# Patient Record
Sex: FEMALE | Race: WHITE
[De-identification: names, ages, dates, MRNs, and addresses within clinical notes are randomized per-mention and may not be internally consistent; named-entity substitution may affect disease eponyms.]

---

## 2020-08-18 NOTE — NUR
CALLED DR TRAMMELL [facility] FOR CONSULT. DONE. HE CALLED. HOLD ;LOVENOX AND MAKE NPO
MIDNITE FOR POSS PROCEDURE. DONE

## 2020-08-18 NOTE — NUR
PT ADMITTED AT 1730. SISTER KAREN IN TO HELP. I WAS ABLE TO DO QUICK ADMIT AND
THE ASSESSMENT. BOTH IV FLUID BOLUS' DONE, MAINT FLUIDS STARTED. CALLED DR TRAMMELL FOR REFERRAL. HE CALLED BACK AND NPO MIDNITE AND HOLD LOVENOX. WILL SEE
TOMORROW. DISCUSSED WITH ONCOMMING RN. H/R REG, NO MURMER ANOTED. TELE WAS
ORDERED. LUNGS CLEAR, RESP EASY, UNLABORED. ON R.A. BT X4 LAST BM PERHAPS 2
DAYS. VOIDS INCONT. PLACED ATTENDS. PT HAS MULT SCABS ON EXTREMETIES, SMALL
BRUISE ON INNER THIGH.  AND MOVEMENT ON RT HAND IS POOR. PERHAPS RELATED
TO PAIN FROM SHOULDER LESION. PT ABLE TO TOUCH NOSE WITHEACH HAND FOREFINGER.
VERY SLOW WITH RT HAND. STATES PAIN. PT IS SLOW TO RESPOND, BUT RESPONDS
AORIENTED 2-3. BED IN LOW POSITION,C ALL LITE IN REACH, BED ALARM ON FOR
SAFETY, SISTER AT BEDSIDE.

## 2020-08-19 NOTE — NUR
Spiritual cre visit conducted. Patient's Day surgery RN Veronica savages me
with a request from family for prayer. I arrive in the day surgery prep area
and patient's sister Shannon is present and informs me that things are not
going well for patient and that patient's O2 stats are 87 and they would like
me to pray for patient and for the surgery. I gladly provide prayer. Shannon
also asks if I could go fing patient's other sister Amanda. I retrieve Amanda
and bring her to the surgery area. I will continue to remain available to
patient and family.

## 2020-08-19 NOTE — NUR
PT REPORT FROM JAMAL ALEJANDRE RN, SISTERS AT BEDSIDE, PT SOMNOLENT, STARTLED WITH
TRANSFER TO [facility]. SISTERS VERY ANXIOUS AT BEDSIDE, BROUGHT PT TO [facility] VIA
[facility] WITHOUT DIFFICULTY, PT O2SATS AT 87% ON RA. TITRATED OXYGEN UP TO 3LNC
TO OBTAIN SATS SUSTAINED AT 93%. TEMP 103.4, UNCOVERED PT AND PLACED COOL
CLOTH TO HEAD, PT DIAPHORETIC AND FLUSHED, HR TACHY 'S. B/P
HYPERTENSIVE. PROVIDED EMOTIONAL SUPPORT TO FAMILY AFTER TAKING CARE OF
PATIENT. SPIRITUAL CARE AND PATIENT ADVOCATE IMPLEMENTED INTO PT CARE. SPENT
ALOT OF TIME TRYING TO KEEP FAMILIES ANXIETY LEVEL AT A MINIMUM WITHOUT MUCH
SUCCESS. DR TRAMMELL AND JOSHUA AWARE OF PT ASSESSMENT AND FAMILY CONCERNS.

## 2020-08-19 NOTE — NUR
positive blood culture called Clarisse in Pharmacy to verify proper antibiotic
coverage. Reestablished IV access with ultrasound guided placement. PT resting
quietly after 0.5 mg po ativan given. incontinent of large amt of urine foul
smelling.
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
## 2020-08-19 NOTE — NUR
DR Almeida gave 1 x dose of ativan for anxiety 0.5 mg for restless behavior.
He also rx dilaudid 1 mg iv q 6 prn severe pain. Sister continues at bedside.

## 2020-08-19 NOTE — NUR
SUMMARY
 
PT BACK FROM SURGERY, FAMILY AT THE BEDSIDE, SISTERS HAVE BEEN AT THE BEDSIDE
FOR MOST OF THE DAY, PT HAS REMAINED CONFUSED FOR MOST OF THE DAY, PT MED PER
EMAR FOR PAIN, PT DOES NOT USE THE CALL LIGHT APPROPRIATELY, PT ONLY ANSWERING
SIMPLE YES AND NO QUESTIONS, PT RETURNED WITH A DRESSING IN PLACE TO THE R
SHOULDER AND A DEBI DRAIN IN PLACE, PT HAS SANGUINEOUS DRAINAGE FROM THE DEBI AND
AROUND THE TUBING, DRESSING HAS BEEN REINFORCED, PT RESTLESS IN BED, THRASHING
AROUND, NOT AWAKE ENOUGH TO ANSWER QUESTIONS, FAMILY WILL REMAIN AT THE
BEDSIDE T/O THE NIGHT, VSS, WILL CONT TO MONITOR
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
## 2020-08-20 NOTE — NUR
SISTER HAS SHOWN UP, WE DISCUSSED THE CHANGES THAT HAVE OCCURRED OVER THE LAST
TWO DAYS. GIL ENDED UP WAKING UP AND IS ABLE TO HOLD CONVERSATION. SHE
DOES KNOW SHE IS IN THE HOSPTIAL BUT THOUGHT IT TO BE IN [place], SHE SAID
SHE GOT HURT AGAIN. THEN SHE REMEMBERED HER SHOULDER. SHE FOLLOWS DIRECTIONS
WELL. TOOK DOWN AN ENSURE AND SOME WATER. GOT UP WITH 1 ASSIST TO THE BSC,
VOIDED 300CC AND WET ATTENDS. MILD PAIN NOTED IN NECK AND BACK. GOT HER BACK
TO HER RECLINER CHAIR ALARM IS ON. SHE IS STILL SLEEPY BUT DOING ALOT BETTER.
WILL CONTINUE TO MONITOR.

## 2020-08-20 NOTE — NUR
ASSUMED CARE: GIL IS UP IN THE RECLINER SLEEPING COMFORTABLY. DOES OPEN
EYES TO HER NAME. SHE IS ABLE TO ANSWER SIMPLE YES NO QUESTIONS. DENIES PAIN
AT THIS TIME. JUST GOT PAIN MEDS PRIOR TO SHIFT CHANGE WHICH HAS MADE HER
SLEEPY. FOLLOWS DIRECTIONS WELL. DRESSING TO SHOULDER IS STILL INTACT, MILD
DRAINAGE NOTED, NO LEAKAGE. IV INFUSING FLUIDS WITH NO PROBLEMS. ATTENDS DRY.
CHAIR ALARM IS ON. WILL CONTINUE TO MONITOR.

## 2020-08-20 NOTE — NUR
Spiritual care visit conducted. I meet with patient's sister, Amanda, outside
the  because patient is sleeping. Amanda tells me about the events of
yesterday and how things improved once patient was in for the procedure.
Amanda shares about the emotions that she is trying to process and that she is
trying to get out of town. I listen empathically, encourage self-care and
provide a calming presence. I will continue to remain available to patient and
family.

## 2020-08-20 NOTE — NUR
SHIFT SUMMARY: ALERT, CONFUSED, ABLE TO SPEAK A FEW SENTENCES BETWEEN
MEDICATING FOR PAIN. SISTER AT BEDSIDE. SLEPT START OF SHIFT DUE TO DILUDID.
THEN SHE BECAME VERY RESTLESS, TOSSING AND TURNING IN BED, UNABLE TO GET
COMFORTABLE, AND NOT UNDERSTANDING WHAT IS HAPPENING. VS WITH TACHYCARDIA IN
'S, FEBRILE RUNNING 99. STILL AWAITING FINAL RESULTS OF BLOOD CULTURES.
RESTLESSNESS CONTINUED TO INCREASE THROUGHOUT THE NIGHT, R/T ANXIOUSNESS AND
BACK PAIN. DILAUDID GIVEN EVERY 4 HOURS BUT SHE WOULD WAKE UP PRIOR TO THAT
PULLING AT LINES, VERY RESTLESS IN BED. ATTEMPTED TO GIVE FENTALYL 50MCQ AND
ATIVAN ORAL. FENTALYL DID NOTHING FOR HER PAIN, AND THE ORAL ATIVAN SHE WOULD
NOT SWALLOW BUT ALLOWED IT TO DISSOLVE ON HER TONGUE. DID NOT GIVE ANY OTHER
ORAL MEDS DUE TO RISK OF ASPIRATION. HAD TO CALL MD DUE TO INCREASE AGGITATION
RESTLESS, GOT IV ATIVAN THIS AM AND GAVE 1MG, THIS HELPED SOME. DRESSING TO
RIGHT SHOULDER HAS BEEN LEAKING SEROUS SANGUOUS FLUID AROUND THE DRAIN TUBE
SITE, RE-ENFORCED DRESSING WITH FOAM DRESSING AND TAPE. DRAIN HAS PUT OUT 20CC
OF SEROUS SANGUOUS. SHE IS CONSTANTLY MOVING ARM WHICH IS CAUSING IT TO LEAK.
LEFT DRESSING IN PLACE AS MD DOES FIRST DRESSING CHANGE. IV ANTIBOTICS INFUSED
WITH NO PROBLEMS. TELE RUNNING SINUS TACH. ATTENDS CHANGE PRN. CALL LIGHT
REMAINED IN REACH OF SISTER WHO STAYED THE NIGHT. WILL REPORT TO DAY SHIFT
WHEN THEY ARRIVE.

## 2020-08-20 NOTE — NUR
SUMMARY
 
PT RESTING IN THE CHAIR AT THE BEDSIDE, FAMILY IN THE ROOM VISITING, PT HAS
BEEN CONFUSED AND RESTLESS T/O THE DAY, PT MORE AWAKE AND ALERT THAN
YESTERDAY, PT ABLE TO GET UP WITH 2P ASSIST AT THE BEGINNING OF THE DAY AND IS
NOW A 1P ASSIST, PT WAS INCONTINENT FOR MOST OF THE DAY, HAS NOW BEGUN TO ASK
TO GET UP PRIOR TO VOIDING, PT TOLERATING PILLS WHOLE WITH WATER AND TOLERATED
BEING FED HER MEALS, PT WITH POOR DEPTH PERCEPTION AND UNABLE TO BRING A CUP
TO HER MOUTH, PT HAS BEEN MED PER EMAR FOR PAIN, PHYSICAL THERAPY HAS WORKED
WITH THE PT AND ARE RECOMMENDING SNF AT THIS TIME, FAMILY IS AGREEABLE AT THIS
TIME, DR WATSON HAS BEEN IN TO SEE THE PT AND HAS CHANGED THE ANTIBIOTICS,
DR TRAMMELL HAS CAME IN AND REMOVED THE DEBI DRAIN, PT AYO WELL, VSS, NO
COMPLAINTS, NO ACUTE CHANGES, WILL CONT TO MONITOR

## 2020-08-21 NOTE — NUR
CALLED NOLAN GROSSMAN, REPORTED NO BM SINCE ADMIT AND THRUSH IN MOUTH. ORDER FOR
NYSTATIN SWISH AND SWALLOW, COLACE AND SENNA. WILL START TONIGHT.

## 2020-08-21 NOTE — NUR
SUMMARY
 
PT RESTING IN THE CHAIR AT THE BEDSIDE, PT HAS BEEN IN THE CHAIR FOR MOST OF
THE DAY, DOES NOT TOLERATE BEING IN THE BED, IS CONFUSED AND RESTLESS, UP WITH
1-2 PERSON ASSIST, MIXED CONTINENCE, TAKES HER PILLS WHOLE WITH WATER WELL,
DOES NEED TO BE FED MEALS, PT WORKED WITH PT/OT, STILL WITH A RECOMMENDATION
OF SNF AT DISCHARGE, PT MED PER EMAR FOR S/S PAIN, DR TRAMMELL HERE TO CHANGE
THE DRESSING TO THE R SHOULDER, 5 INCH LONG INCISION WITH STITCHES INTACT,
AREA BRUISED, NO BLEEDING OR OOZING NOTED, PT AYO WELL, PT WITH LOW GRADE
TEMP, GIVEN TYLENOL WITH GOOD RESULTS, VSS, WILL CONT TO MONITOR

## 2020-08-21 NOTE — NUR
ASSUMED CARE. GIL IS IMPROVING WELL. SHE IS ABLE TO STATES PLACE, EVENT,
FAMILY, FOLLOW DIRECTIONS, AND READ THE DATE AND TIME OFF THE BOARD. SHE KNOWS
HOW TO USE HER CALL LIGHT, AND DOES NOT TRY TO GET UP ON HER OWN. STILL
REPORTS PAIN IN SHOULDER AND ALL OVER. MEDICATED FOR PAIN AND DISCOMFORT. IV
ANTIBOTIC STARTED. STILL DRINKING SEVERAL GLASSES OF WATER THROUGHOUT THE DAY.
DRY MOUTH IS IMPROVING. NOTED SHE IS STARTED TO GET THRUSH ON THE SIDE OF HER
GUMS AND BACK OF THROAT. SHE ALSO HAS NOT HAD A BM SINCE SHE WAS ADMITTED.
WILL CALL MD REGARDING THESE ISSUES. CALL LIGHT IS IN HER REACH.

## 2020-08-21 NOTE — NUR
SHIFT SUMMARY: GIL HAS IMPROVED VERY WELL THROUGHOUT THE NIGHT. SHE IS
SPEAKING IN FULL SENTENCES, HOLDING CONVERSATIONS, RESPONDING NORMAL TO
QUESTIONS. NOT AS RESTLESS. HAS REPORTED PAIN WHEN SHE HAS HAD IT. HAS BEEN
DOWNING FLUIDS LIKE CRAZY. SISTER HAS BEEN AT HER SIDE ALL NIGHT. SHE HAS BEEN
GETTING UP TO BSC WITH ASSIST. VS HAVE REMAINED STABLE. IV FLUIDS AND
ANTIBOITICS INFUSED WITH OUT PROBLEMS. SHE STILL HAS SOME CONFUSION ON WHERE
SHE IS AT AND WHAT HAD HAPPENED TO HER, BUT SHE IS REMEMBERING CONVERSATIONS
AND DIRECTIONS. CHAIR ALARM IS ON, CALL LIGHT IN REACH. WILL REPORT TO DAY
SHIFT.

## 2020-08-21 NOTE — NUR
LAB CALLED TO REPORT POSITIVE BLOOD CULTURES GRAM + COCCI IN CLUSTERS. THIS IS
THE SAME GROWTH SHE HAD ON THE LAST BLOOD CULTURES. PAIN BETTER CONTROLLED.

## 2020-08-22 NOTE — NUR
PT AOX2-3 IT CAN VERY ON HOW AWAKE SHE IS AT THE TIME. PT STARTED SHIFT OUT
LOOKING VERY UNCOMFORTABLE AND WAS STATING HER PAIN WAS A 7. PT WAS TREATED
FOR PAIN PER EMAR. PT SEEMED TO DO BETTER AS THE MORNING WENT BY AND IS NOW
COMFORTABLE IN HER ROOM AND DOESN'T APPEAR TO BE IN PAIN WHEN LOOKING AT HER.
PT IS ALSO TALKING MORE. CALL LIGHT IS WITHIN REACH WILL CONTINUE TO MONITOR.

## 2020-08-22 NOTE — NUR
PT HAD LARGE INCONTINENT ATTENDS DIAPERS; PT WAS BLADDER SCANNED FOR > 691; DR KOROMA NOTIFIED WITH ORDERS TO PLACE MENDEZ CATHETER. (MD WAS ADVISED OF
PREVIOUS STRAIGHT CATHETERIZATION AT 1100 TODAY).

## 2020-08-22 NOTE — NUR
SHIFT SUMMARY: ALERT AND ORIENTED TO PLACE, EVENT, FAMILY, FOLLOWING
DIRECTION, ABLE TO USE CALL LIGHT, AND HOLD CONVERSATION. SHE DOES HAVE SOME
TROUBLE WITH DATE AND TIME. SLEPT WELL THROUGHOUT THE NIGHT IN THE BED. DID
GET PAIN MEDICATION X2 THIS EVENING. 2ND ROUND OF BLOOD CULTURES POSITIVE FOR
GRAM+ COCCI IN CLUSTERS, SAME AS PREVIOUS ONES. UNABLE TO COLLECT UA DUE TO
INCONTIENCE IN THE NIGHT. CALLED NOLAN NP AND REPORTED THRUSH IN ORAL CAVITY,
AND NO BM SINCE ADMIT. GOT COLACE, SENNA AND NYSTATIN SWISH AND SWALLOW.
ADMINISTERED ALL THREE. DISCUSSED POSSIBLE NEED FOR DIFLUCAN TO PREVENT YEAST
INFECTION, AND SHE WILL NEED TO BE STARTED ON PROBIOTICS. VS STILL TACHY AT
TIMES, AFEBRILE. WBC ELEVATED UP TO 13 THIS MORNING. LIVER ENZYMES ALSO
ELEVATED. BED ALARM HAS REMAINED ON. CALL LIGHT IN REACH, WILL DISCUSS CARE
WITH DAY SHIFT.

## 2020-08-22 NOTE — NUR
64 Y/O FEMALE RESTING COMFORTABLY IN BED; CHEERFUL; ALERT AND ORIENTED X 3;
ABLE TO FOLLOW SIMPLE VERBAL COMMANDS; RIGHT SHOULDER DRESSING DRY AND INTACT;
DENIES PAIN.

## 2020-08-23 NOTE — NUR
SHIFT SUMMARY:
64 Y/O FEMALE RESTED COMFORTABLY ALL SHIFT; PT IS ALERT AND ORIENTED X 4, ABLE
TO FOLLOW ALL SIMPLE VERBAL COMMANDS; PT HAD BLADDER SCAN RESULT > 691 WITH
HOSPITALIST NOTIFIED AND MENDEZ CATHETER WAS PLACED WITH 700ML TYSON FLUID
NOTED BY JS MONTENEGRO RN (UA SENT TO LAB); C/O RIGHT SHOULDER PAIN 7/10 WITH
TYLENOL 650MG PO GIVEN WITH RELIEF FELT; RIGHT SHOULDER DRESSING DRY AND
INTACT; TELEMETRY REFLECTS SINUS TACHYCARDIA PER CURRY--TELE TECH; BED LOW
POSITION WITH CALL LIGHT AT SIDE.

## 2020-08-23 NOTE — NUR
SHIFT SUMMARY:
NO ACUTE CHANGES TO REPORT THIS SHIFT. PT A&O; OCC CONFUSION; CALM AND
COOPERATIVE WITH CARE. MEDICATED FOR R SHOULDER PAIN PER EMAR; LIDOCAINE TOP
TO L FLANK. TELE IN PLACE; ST IN LOW 100s. FOLE IN PLACE; PATENT & DRAINING.
I&D ON R SHOULDER ON 08/21; DRESSING C/D/I. POSITIVE BLOOD CULTURES; STAPH
GROWING; OXACILLIN Q4. REPORT GIVEN TO ONCOMING RN.

## 2020-08-24 NOTE — NUR
ASSUMED CARE: GIL HAS SLIDE DOWN IN BED, APPEARS TO BE VERY UNCOMFORTABLE.
OFFERED TO GET HELP TO MOVE HER UP IN BED. SHE REFUSED. OFFERED TO PUT PILLOWS
AT LEAST BEHIND HER AND AGAIN SHE REFUSED. STATES SHE HURTS ALL OVER. JUST GOT
PAIN MEDS NOT TO LONG AGO. OFFERED HER THE HEATING PAD AND AGAIN NO. LUNG
SOUNDS CLEAR, HR SINUS, TELE IN PLACE. IV FLUIDS KVO, IV ANTIBOTIC HUNG.
CATHETER PATENT AND DRAINING. BLE EDEMA +1 ELEVATED ON PILLOWS. DRESSING TO
SHOULDER HAS MINIMAL DRAINAGE, INTACT. ADMINISTERED MEDS. ABLE TO STATE
ORIENTATION QUESTIONS, SMILES AND IS IN GOOD MOOD. VS WNL. WILL CONTINUE TO
MONITOR.

## 2020-08-24 NOTE — NUR
Met pt in bed  resting, she reports to be doing well encouraged pt., offered
prayes and spiritual support.

## 2020-08-24 NOTE — NUR
HEMOTOLOGY CALLED REPORTING POSITIVE BLOOD CULTURES, SAME RESULTS AS PREVIOUS.
NO CHANGE. WILL NOTE.

## 2020-08-24 NOTE — NUR
PT IS A/OX3, PLEASANT AND COOPERATIVE, PT IS WEAK ON HER FEET AND HAS LIMITED
MOVENET IN HER UE, PT WAS MEDICATED FOR PAIN T/O THE DAY, THE PT WORKED WITH
THE PHYSICAL AND OCCUPATIONAL THERAPIST TODAY AND TOLERATED FAIRLY, THE PT WAS
UP IN THE CHAIR AT THE BEDSIDE FOR BREAKFAST AND FOR LUNCH, THE PT IS SOB WITH
LITTLE EXCERTION, APPEARS TO BE BREATHING EASILY AT REST WITHOUT OXYGEN, A MARY
WAS ORDERED FOR THE PT AND  WAS CONSULTED AND HAS SEEN THE PT,
FAMILY WAS IN TO SEE THE PT, THE PT HAD A SMALL BM TODAY, CALL LIGHT IN REACH,
WILL CONTINUE TO MONITOR AND ASSESS FOR CHANGES

## 2020-08-24 NOTE — NUR
SUMMARY
PT HAD PAIN OFF AND ON T/O SHIFT. PT TX PER EMAR W/ RELIEF. PT HAD NO NOTED
CHILLS OR FEVERS. PT HAS SLEPT WELL THIS SHIFT. PT CURRENTLY SLEEPING IN NO
DISTRESS. CALL LIGHT IN REACH.
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
## 2020-08-24 NOTE — NUR
Spiritual care visit conducted. Patient is sitting up in bed and alert.
Patient tells me about her progress in decreased pain and discomfort and
improved mental clarity but patient says she has a long way to go. Patient
shares about personal issues and how these issues are affecting her today. We
discuss her yonny and family which are sources of strength to patient. I
listen empathically, normalize patient's experience, and provide pastoral
 and prayer. Patient responds well and shows signs of catharsis and
improved hope.  I will continue to assist patient and family with the
emotional/spiritual aspects of the medical conditions.

## 2020-08-24 NOTE — NUR
GIL WAS AWAKE BUT WAS NOT ACTING LIKE HERSELF. ASKED HER WHAT IS WRONG.
SHE BEGAN TO TELL ME ABOUT THE OPENING UNDERNEATH THE WHITE BOARD AND ALL THE
PEOPLE LOOKING AT HER. TRIED TO RE-ORIENT HER AND SHE GOT VERY UPSET AND ANGRY
NOT UNDERSTANDING THAT THERE WAS NOTHING THERE BUT A WALL. EVEN ENCOURAGED HER
TO TRY AND GET UP TO SEE FOR HERSELF BUT SHE REFUSED. REFUSED TO EVEN MOVE IN
BED. WAS ABLE TO PUT THE HEAD OF HER BED DOWN AT LEAST. WILL MONITOR. CALL
LIGHT IN REACH.

## 2020-08-25 NOTE — NUR
Met pt sitting in a chair  and talking with  her nurse in  the room who is
attending  to her needs, prayed for the pt.

## 2020-08-25 NOTE — NUR
PT IS BACK FROM THE HC A/O APPEARS TO BE BREATHING EASILY, MEDICATED THE PT
FOR PAIN, CALL LIGHT IN REACH, FAMILY AT THE BEDSIDE

## 2020-08-25 NOTE — NUR
PT IS A/OX3, THIS AM THE PT HALUCINATED THAT HER FAMILY WAS AT HER BEDSIDE
SINGING TO HER, HOWEVER,
THE PT WAS ABLE TO RECALL WHAT TOWN SHE WAS IN AND HER
LOCATION HERE AT [facility], PT PT WAS ASSISTED UP TO THE CHAIR, THE PT
WAS NPO FOR MARY AT 1200, THE PT WAS TAKEN TO THE [facility] AND RETURNED,
BREATHING EASILY ON PT, THE PT WAS MEDICATED FOR PAIN X2 SO FAR TODAY, PT
APPEARS COMFORTABLE AT REST, HOWEVER, SEEMS TO BE IN A GREAT DEAL OF PAIN WITH
ACTIVITY, THE PTS SISTER WAS IN TO VISIT WITH THE PT EARLIER TODAY, THE PT IS
NOW UP IN THE CHAIR AT THE BEDSIDE, CALL LIGHT IN REACH, WILL CONTINUE TO
MONITOR AND ASSESS FOR CHANGES

## 2020-08-25 NOTE — NUR
SHIFT SUMMARY; AOX3 AT START OF SHIFT BUT EVENTUALLY BECAME CONFUSED DURING
THE NIGHT, HULLUCINATIONS OF PEOPLE IN THE ROOM, TALKING TO SELF, AUDITORY
HULLUCINATIONS AND BEHAVIORAL CHANGES. WHEN TRYING TO REORIENT, SHE GOT
IRRITATED AND ANGRY THINKING WE WERE LYING TO HER. SHE EVEN ATTEMPTED TO GET
OUT OF BED FORGETTING SHE WAS IN THE HOSPITAL OR THAT SHE HAD A CATHETER. VS
WERE WNL, AFEBRILE. BLOOD CULTURES + FOR GRAM + COCCI IN CLUSTERS. PAIN
CONSISTED ALL OVER BODY NEEDING OXYCODONE EVERY 4 HOURS. REFUSED
REPOSITIONING. NPO AFTER MIDNIGHT FOR DR. ACOSTA. GOOD CATHETER OUTPUT OF
1300. IV INTAKE 1823, PO INTAKE 960 THIS SHIFT. MILD SWELLING TO BLE. SLEPT
OFF AND ON BUT NOT CONSISTENT. BED ALARM REMAINED ON, CALL LIGHT IN REACH.

## 2020-08-25 NOTE — NUR
ASSUMED CARE. GIL WAS SITTING UP IN THE RECLEINER AND APPARENTLY PULLED
OUT HER IV WHICH WAS STILL INFUSING. SHE HAD BLOOD ALL OVER HER GOWN, CHAIR
AND LAP. SISTER FOUND HER THAT WAY. SHE WAS CLEANED UP AND IV WAS STOPPED. CNA
GOT HER BACK TO BED. ATTEMPTED TO START NEW IV AND WAS UNSUCCESSFUL. CALLED
CHARGE RN SONI TO SEE START ONE. GIL IS ABLE TO ANSWER ALL ORIENTATION
QUESTIONS BUT WITH HULLUCINATIONS BOTH AUDITORY AND VISUAL. WHEN MENTIONED SHE
GETS FRUSTRATED AND THINKS THE STAFF IS CALLING HER CRAZY SO SHE TRYS TO HIDE
IT. THE PERIODS OF CONFUSION COME AND GO BUT TEND TO GET ALOT WORSE AT NIGHT.
MEDICATED FOR PAIN GENERALIZED ALL OVER BUT MOSTLY IN BACK. POOR APPETITE
STILL DISPITE SISTER ENCOURAGING HER TO EAT. CATHETER STILL PATENT AND
DRAINING. SWELLING NOTED ON LEFT MEDIAL SIDE OF ARM AROUND THE ELBOW AREA LIKE
THE IV INFILTRATED BUT WAS NOT SWOLLEN AT IV INSERTION SITE. CONCERN FOR THAT
JOINT HAVING PROBLEMS LIKE THE SHOULDER. WILL MONITOR, CURRENTLY NO WARMTH OR
REDNESS NOTED. BED ALARM IS ON. SONI KENNEDY IN THE ROOM.

## 2020-08-26 NOTE — NUR
SPOKE TO NOLAN GROSSMAN REGARDING SLEEPING AID AND DC OF FENTANYL AS THE PATIENT
DOES NOT USE THIS MEDICATIONS. ORDER RECEIVED. WENT TO ROOM THE PATIENT WAS
ASLEEP.

## 2020-08-26 NOTE — NUR
SHIFT SUMMARY: GIL ALERT AND ORIENTED WITH PERIODS OF CONFUSION AND
HULLUCINATIONS, TALKING TO SELF IN THE ROOM. REPORTS PAIN GENERALIZED ALL OVER
BUT MOSTLY HER NECK, ARMS, ELBOWS AND LOWER BACK. BILATERAL ELBOWS ARE VERY
SWOLLEN, TENDER TO TOUCH BUT NO REDNESS. SHE REPORTS THAT HER JOINTS STARTED
HURTING WHEN SHE STARTED TO GET ILL. MEDICATED WITH OXYCODONE PRN. SHE DOES
NOT LIKE TO BE MOVED DUE TO THE PAIN. IV FLUIDS AND ANTIBOTICS INFUSED WITH NO
ISSUES. NEW IV WAS STARTED IN HER LEFT AC. POOR APPETITE. GOOD HYDRATION.
CONCERNED ABOUT THE EDEMA IN HER JOINTS AND THE PAIN IT IS CAUSING HER. WILL
PASS ONTO DAY SHIFT. CALL LIGHT REMAINED IN REACH, BED ALARM ON.

## 2020-08-26 NOTE — NUR
ASSUMED CARE: GIL IS DOING MUCH BETTER WITH HER MEMORY. SHE IS ABLE TO
ANSWER ALL ORIENTATION QUESITONS. SHE IS IN A MUCH BETTER MOOD. HER PAIN HAS
BEEN AVERAGING A 5 AT THE HIGHEST. SHE IS MOVING AROUND MUCH BETTER. HER
APPETITE IS STILL VERY POOR. ENCOURAGE HER TO EAT MORE. IV FLUIDS RUNNING AT
KVO FOR EVERY 4HR ANTIBOTICS. CATHETER STILL PATIENT. ASSISTED TO BEDSIDE
COMMODE 1 ASSIST. LARGE FORMED BROWN BM NOTED. ASSISTED BACK TO BED
REPOSITIONED TO HELP SUPPORT HER NECK. GAVE PM MEDS AND PAIN PILL.
STRAIGHTENED UP THE ROOM AND CLEANED IT FOR HER. GAVE HER SOME MAGAZINES AND
BIBLE TO HAVE SOMETHING TO DO. CALL LIGHT IS IN REACH.

## 2020-08-26 NOTE — NUR
SHIFT SUMMARY:
NO ACUTE CHANGES TO REPORT THIS SHIFT. PT ALERT; CONFUSED R/T TBI; COOPERATIVE
WITH CARE. MEDICCATED FOR BACK & NECK PAIN PER EMAR; LIDOCAINE TOP IN PLACE TO
L LOWER BACK. MENDEZ IN PLACE; PATENT & DRAINING. TELE IN PLACE; SR c 1DEG
BLOCK @ 92. BLOOD CULTURES POSITIVE FOR STAPH; OXACILLIN Q4; INFECTIOUS
DISEASE FOLLOWING. REPORT GIVEN TO ONCOMING RN.

## 2020-08-26 NOTE — NUR
Pt. is sitting in a chair and her therapist in the room attending to her needs
, offered prayers and spiritual support.

## 2020-08-27 NOTE — NUR
SHIFT SUMMARY: MENTAL STATUS IMPROVED THROUGHOUT THE NIGHT- EVIDENCE BY PT
REMEMBERING ORIENTATION, NO HULLUCINATIONS OR TALKING TO SELF. EDEMA IS
CONSISTENT IN THE BILATERAL ELBOWS AND BLE. PAIN CONSTANT IN LOWER BACK AND
NECK, NEEDING OXYCODONE Q4 HOURS. PAIN IS 3-5 ON AVERAGE. ABLE TO TURN SELF
AND USE BED CONTROLS FOR REPOSITIONING 50% OF TIME. IV FLUIDS CONTINUE ON KVO.
MD CALLED FOR SLEEP AID. MELATONIN WAS GIVEN. HELD RESTROL TONIGHT. WHICH MAY
BEEN THE IMPROVEMENT IN MENTATION. LARGE BM NOTED. ROOM STRAIGHTEN UP AND
CLEANED. PT GIVEN MAGAZINES AND BIBLE TO KEEP COMPANY. DRESSING REMAINED
INTACT. CALL LIGHT IN REACH, BED ALARM ON. WILL REPORT TO DAY SHIFT.

## 2020-08-27 NOTE — NUR
ORDER CLARIFICATION
DR. ROBINSON CALLED TO CLARIFY NM SCAN ORDER. DR. WATSON CONFIRMED THAT HE
WANTED THE WHITE CELL TEST DONE. PT TO HAVE SCAN TOMORROW.

## 2020-08-27 NOTE — NUR
SHIFT SUMMARY
PT REQUIRING 2L O2 STILL TO MAINTAIN O2 SATS. TELEMETRY IN PLACE AND RUNNING
NSR IN THE 80S PER TELE TECH. PT SITTING UP IN BED, SNACKING ON WATERMELON.
DENIES CP OR SOB AT THIS TIME. SLIGHT EDEMA TO LEGS. OTHERWISE NORMAL
ASSESSMENT. PT UP IN ROOM INDEPENDENTLY. CALL LIGHT AT BEDSIDE. VS REVIEWED.
WILL CONTNUE TO MONITOR UNTIL TURNOVER IS COMPLETE.

## 2020-08-27 NOTE — NUR
SHIFT SUMMARY
DR. WATSON ORDERED FOR NC WHITE CELL SCAN TO BE COMPLETED. WILL OCCUR
TOMORROW ACCORDING TO NUC MED. PT INTERMIDETELY CONFUSED. VERY FORTGETFUL.
RESPONDS TO REORIENTATION WELL. NO CHANGES IN SWELLING T/O BODY SINCE THIS AM.
PT VOIDING WELL. BLADDER TRAINING IN PROGRESS. PT UP TO CHAIR FOR MOST THE
DAY. WORKED WITH PT/OT THIS SHIFT. NO OTHER CHANGES IN ASSESSMENT AT THIS
TIME. DRESSING TO L SHOULDER C/D/I. WILL CONTINUE TO MONITOR UNTIL TURNOVER IS
COMPLETE. VS REVIEWED.

## 2020-08-28 NOTE — NUR
Spiritual care visit conducted. Patient is sitting on a chair and alert.
Patient tells me about the procedure that her doctor has initiated, about the
difficult morning she has had with stiffness and soreness and about the plan
to have her go to [facility]. Patient also talks about her family
visits and phone calls and how much they mean to her. Patient weaves her yonny
into the discussion at every opportunity and emphasizes the importance of her
yonny to her sanity. I normalize patient's worries and fears, and provide
spiritual  and prayer. Patient responds well and shows signs of an
elevated mood.  I will continue to remain available to patient and family.

## 2020-08-28 NOTE — NUR
SHIFT SUMMARY
 
LYING IN SEMI FOWLERS WITH EYES CLOSED.  MEDICATED FOR PAIN PER MD ORDERS.
ATTEMPTED TO AMBULATE TO COMMODE WITH ASSISTACE, BUT UNABLE TO TOLERATE.
ABLE TO GET BACK INTO BED WITH MINIMAL ASSISTANCE, AND DIRECTION AND GUIDANCE.
DENIES PAIN, DISCOMFORT, OR FURTHER NEEDS AT THIS TIME.  SAFETY MEASURES IN
PLACE.  WILL CONTINUE TO MONITOR AND GIVE HAND OFF TO ONCOMING SHIFT USING
SBAR DURING BEDSIDE REPORT.

## 2020-08-28 NOTE — NUR
SHIFT SUMMARY
PT HAD [facility] MED WHITE CELL SCAN COMPLETED THIS SHIFT. FAMILY AT BEDSIDE. PT UP
TO CHAIR MOST THE DAY. CONT/INC VOIDS T/O SHIFT. PT RECIEVED ONE TIME ATIVAN
2MG DOSE PRIOR TO SCAN. PT ALERT, BUT MORE DISORIENTED FROM THIS. PT STATES
SHE FEELS "LOOPY" NO OTHER ACUTE CHANGES IN ASSESSMENT AT THIS TIME. VS
REVIEWED. WILL CONTINUE TO MONITOR UNTIL TURNOVER IS COMPLETE.

## 2020-08-29 NOTE — NUR
MORNING SUMMARY
 
PT HANDOFF TO BRENT OLVERA, @0850. REPORT AT BEDSIDE, . HIGH FALL RISK DUE
TO IMPULSIVENESS.  ALERT AND ORIENTED BUT FORGETFUL. DTR NOTIFIED OF ROOM
CHANGE.  MD NOTIFIED OF CRITICAL PLT COUNT 1006.  PT ORIENTED
TO ROOM, CALL LIGHT IN REACH, BED ALARM ON.

## 2020-08-29 NOTE — NUR
pt moved to back brown for confussion. Attemtpted to see today will follow up
with family on advance care planning.

## 2020-08-29 NOTE — NUR
SUMMARY
PT WAS TRANSFERED TO [facility]U THIS AM D/T IMPULSIVE BEHAVIOR, GETTING UP W/O
ASSIST, FALL RISK. SHE HAS USED CALL SYSTEM APPROPRIATELY SINCE ARRIVING TO [facility]
353. SHE IS A/O X3, PLEASANT AFFECT, HX TBI. SHE HAS BUE TREMOR THAT SHE STATE
STARTED RECENTLY w CURRENT ILLNESS. DX BACTEREMIA, QUESTIONABLE SOURCE/REPORT
[facility] MED WBC SCAN YESTERDAY WAS NEGATIVE. SHE HAD I&D R SHOULDER ABCESS. IV
ANTIBX CONTINUE. WBC WNL, AFEBRILE. ESR ELEVATED >140. PLT CH 1006. SHE STATE
WEAKNESS/FATIGUE. STATE NECK & BACK PAIN. HAVE GIVEN OXYCODONE & TYLENOL FOR
RELIEF. R SHOULDER SURG SITE w SUTURES IN PLACE, WOUND CARE/DRSG CHANGE
PROVIDED. SHE HAS BEEN UP IN CHAIR, UP TO BR w 1 ASSIST, CONT/INCONT. VSS.

## 2020-08-29 NOTE — NUR
SUMMARY
PT HAS SOME NOTED HALLUCINATIONS THIS SHIFT. PT PAIN HAS BEEN MANAGED WELL. PT
HAS BEEN VOIDING VIA BSC. PT HAS SLEPT SOME AND IS CURRENTLY AWAKE. CALL LIGHT
IN REACH AND BED ALARM ON.

## 2020-08-30 NOTE — NUR
SUMMARY
PT IS A/O X3, PLEASANT AFFECT. SHE STATE FEELING SOMEWHAT BETTER TODAY THAN
PREVIOUS DAY HOWEVER STATE CONTINUING NECK-BACK PAIN, HAVE GIVEN PRN OXYCODONE
& TYLENOL FOR RELIEF. CNA GAVE HER SHOWER TODAY, ALLOWED PT TO SIT UNDER HOT
WATER,SHE STATE GAVE HER GOOD BACK PAIN RELIEF. WBC WNL, SHE HAS BEEN AFEBRILE
T/O DAY. IV ANTIBX CONTINUE. VSS.

## 2020-08-30 NOTE — NUR
SHIFT SUMMARY
 
PT CONTINUES TO COMPLAIN OF R SHOULDER PAIN AND BACK PAIN. MEDICATED FOR PAIN
PER ORDERS. Q4HR ABX CONTINUED. PT A/OX4, PLESANT WITH CARE. PT SLEPT IN
RECLINER FOR MOST OF THE NIGHT AND REFUSED THE BED WHEN ASKED. EDEMA TO BLE
ELEVATES EXTREMITIES WHILE IN CHAIR. HER ASSESSMENT IS UNCHANGED
OVERNIGHT. VITALS STABLE. BED IN LOWEST POSITION, CALL LIGHT WITHIN REACH.
WILL CONTINUE TO MONITOR AND REPORT TO ONCOMING RN.

## 2020-08-31 NOTE — NUR
SHIFT SUMMARY
 
PT HAS HAD OFF AND ON PAIN T/O THE NIGHT. MEDICATED PER EMAR WITH EFFECT. PT
HAS ALTERNATED BETWEEN THE BED AND THE CHAIR TO HELP ALLEVIATE HER BACK/NECK
AND SHOULDER PAIN. PT AMBULATES WELL WITH SBA. VITALS ARE STABLE. Q4HR
ANTIBITOICS CONTINUED. PT IS A/OX4, PLESANT. PLAN IS FOR POSS DC TODAY. NO
ACUTE CHANGES OVERNIGHT. BED IN LOWEST POSITION, CALL LIGHT WITHIN REACH.

## 2020-08-31 NOTE — NUR
ASSUMED CARE. GIL REMEMBERED ME FROM PREVIOUS WEEKS OF TAKING CARE OF HER.
AOX3, FORGETFUL AT TIMES. LUNG SOUNDS ARE CLEAR. HR SINUS. NO CHEST PAIN, SOB,
OR LIGHTHEADNESS. STATES APPETITE HAS IMPROVED ALOT SINCE THRUSH HAS GOTTEN
BETTER IN MOUTH. SKIN WITH REDNESS IN GROIN AREA. BARRIER CREAM PRN. ABLE TO
GET UP MOSTLY ON HER OWN. USES WALKER TO GET TO THE BATHROOM. SHE IS STURDY ON
HER FEET. PAIN IS 7/10 TO SOON TO GIVE MEDS. OFFERED HEATING PAD, STATES SHE
WILL BE OK. EDEMA NOTED TO BLE, WHEN GETTING BACK TO BED, SHE ELEVATES ON
PILLOWS. IV FLUIDS INFUSING. CALL LIGHT IN REACH.

## 2020-08-31 NOTE — NUR
SHIFT SUMMARY
PT IS A&O.  PT HAS BEEN IN PAIN MOST OF THE SHIFT.  PRN MEDICATION HAS BEEN
GIVEN TO HELP CONTROL PAIN LEVEL.  ADDITIONAL NON PHARMILOGICAL PAIN
MANAGEMENT HAS BEEN DONE TO HELP WHEN MEDICATION IS NOT AVAILABLE.  PT USES
DEEP BREATHING AND THE MEDITATION CHANNEL TO HAS HELPED.  PHYSICAL THERPHY
HELPED PT WALK THE SMALLS, PT ENJOYED IT.  PT DOES FORGET LIMITATIONS AT TIMES
AND NEED TO BE REMINDED TO USE THE CALL LIGHT.  MRI SCHEDULED FOR TOMORROW.
PT EATING DINNER IN HER CHAIR CURENTLY. PAIN LEVEL AT 1. NO COMPLAINTS OF N/V
DURING SHIFT.  PT IS A 1 [employer] WITH LIMITED ASSISTANCE.

## 2020-09-01 NOTE — NUR
Met pt. lying in bed  and talking with her visitore in the room , pt. reports
doing much better encouraged  pt. and offered prayers

## 2020-09-01 NOTE — NUR
PT HAS BEEN AOX4 AND COOPERATIVE OF CARE ALL DAY. PT TREATED FOR NECK AND BACK
PAIN PER EMAR. DR RUELAS ORDERED MRI TO BE DONE. UPON COMPLETION IT WAS FOUND
PT HAD MULTIPLE ABCESSES IN HER SPINE AND PT NEEDED AIR FLIGHTED TO [facility].
NECK BRACE WAS APPLIED AND REPORT WAS CALLED AT 1635 TO RECIEVING BRENT MUIR. PT
WAS PREPPED AND QUICKLY ORGANIZED TO FLY OUT. CHARGE TO NOTIIFY FAMILY OF
TRANSPORT. PERSONAL BELONGINGS WENT WITH PT. PT STOOD FROM CHAIR TO [facility]
AS SHE HAS BEEN AMBULATING ALL DAY. NO DISTRESS NOTED. IV ANTIBOTIC DUE AT
1600 SENT WITH PT TO BE GIVEN IN FLIGHT.

## 2020-09-01 NOTE — NUR
SHIFT SUMMARY: GIL HAD A GOOD NIGHT DISPITE HER BEING UP AND DOWN. SHE IS
DOING BETTER, ABLE TO AMBULATE TO THE BATHROOM WITH NO ASSISTANCE OTHER THEN
IV. SHE IS AOX3 THROUGHOUT THE NIGHT, NO CONFUSION. SHE USED HER CALL LIGHT
EVERY TIME. PAIN IS MANAGED WITH OXYCODONE, AT TIMES SHE HAS INCREASED IN PAIN
WHERE WE HAVE HAD TO USE TYLENOL IN BETWEEN DOSAGES. THIS PAIN TENDS TO START
WITH IN THE 3  HOURS AFTER HER DOSE. SHE IS UNABLE TO SLEEP DUE TO THIS. IV
CONTINUED TO INFUSE, ANTIBOTICS ADMINISTERED. VS WNL, AFEBRILE. NO OTHER
CHANGES TO NOTE THIS SHIFT. WILL REPORT TO DAY SHIFT. CALL LIGHT IN REACH.

## 2020-10-06 ENCOUNTER — HOSPITAL ENCOUNTER (OUTPATIENT)
Dept: HOSPITAL 95 - LAB SHORT | Age: 65
Discharge: HOME | End: 2020-10-06
Attending: INTERNAL MEDICINE
Payer: MEDICARE

## 2020-10-06 DIAGNOSIS — R78.81: ICD-10-CM

## 2020-10-06 DIAGNOSIS — B95.61: ICD-10-CM

## 2020-10-06 DIAGNOSIS — T84.63XA: Primary | ICD-10-CM

## 2020-10-06 DIAGNOSIS — G06.1: ICD-10-CM

## 2020-10-06 DIAGNOSIS — Z79.2: ICD-10-CM

## 2020-10-06 DIAGNOSIS — E78.5: ICD-10-CM

## 2020-10-06 DIAGNOSIS — S06.9X0S: ICD-10-CM

## 2020-10-06 LAB
ALBUMIN SERPL BCP-MCNC: 2.2 G/DL (ref 3.4–5)
ALBUMIN/GLOB SERPL: 0.5 {RATIO} (ref 0.8–1.8)
ALT SERPL W P-5'-P-CCNC: <6 U/L (ref 12–78)
ANION GAP SERPL CALCULATED.4IONS-SCNC: 5 MMOL/L (ref 6–16)
AST SERPL W P-5'-P-CCNC: 29 U/L (ref 12–37)
BILIRUB SERPL-MCNC: 0.5 MG/DL (ref 0.1–1)
BUN SERPL-MCNC: 8 MG/DL (ref 8–24)
CALCIUM SERPL-MCNC: 8.9 MG/DL (ref 8.5–10.1)
CHLORIDE SERPL-SCNC: 108 MMOL/L (ref 98–108)
CO2 SERPL-SCNC: 26 MMOL/L (ref 21–32)
CREAT SERPL-MCNC: 0.44 MG/DL (ref 0.4–1)
GLOBULIN SER CALC-MCNC: 4.5 G/DL (ref 2.2–4)
GLUCOSE SERPL-MCNC: 93 MG/DL (ref 70–99)
POTASSIUM SERPL-SCNC: 4.1 MMOL/L (ref 3.5–5.5)
PROT SERPL-MCNC: 6.7 G/DL (ref 6.4–8.2)
SODIUM SERPL-SCNC: 139 MMOL/L (ref 136–145)

## 2020-10-14 ENCOUNTER — HOSPITAL ENCOUNTER (OUTPATIENT)
Dept: HOSPITAL 95 - OLS | Age: 65
End: 2020-10-14
Attending: INTERNAL MEDICINE
Payer: MEDICARE

## 2020-10-14 DIAGNOSIS — R78.81: ICD-10-CM

## 2020-10-14 DIAGNOSIS — S06.9X0S: ICD-10-CM

## 2020-10-14 DIAGNOSIS — B95.61: ICD-10-CM

## 2020-10-14 DIAGNOSIS — T84.63XA: Primary | ICD-10-CM

## 2020-10-14 DIAGNOSIS — Z79.2: ICD-10-CM

## 2020-10-14 DIAGNOSIS — G06.1: ICD-10-CM

## 2020-10-14 DIAGNOSIS — E78.5: ICD-10-CM

## 2020-10-14 LAB
ALBUMIN SERPL BCP-MCNC: 2.5 G/DL (ref 3.4–5)
ALBUMIN/GLOB SERPL: 0.5 {RATIO} (ref 0.8–1.8)
ALT SERPL W P-5'-P-CCNC: 11 U/L (ref 12–78)
ANION GAP SERPL CALCULATED.4IONS-SCNC: 8 MMOL/L (ref 6–16)
AST SERPL W P-5'-P-CCNC: 13 U/L (ref 12–37)
BASOPHILS # BLD AUTO: 0.04 K/MM3 (ref 0–0.23)
BASOPHILS NFR BLD AUTO: 1 % (ref 0–2)
BILIRUB SERPL-MCNC: 0.4 MG/DL (ref 0.1–1)
BUN SERPL-MCNC: 13 MG/DL (ref 8–24)
CALCIUM SERPL-MCNC: 9.7 MG/DL (ref 8.5–10.1)
CHLORIDE SERPL-SCNC: 107 MMOL/L (ref 98–108)
CO2 SERPL-SCNC: 25 MMOL/L (ref 21–32)
CREAT SERPL-MCNC: 0.46 MG/DL (ref 0.4–1)
CRP SERPL-MCNC: 4.51 MG/DL (ref 0–0.3)
DEPRECATED RDW RBC AUTO: 47.8 FL (ref 35.1–46.3)
EOSINOPHIL # BLD AUTO: 0.36 K/MM3 (ref 0–0.68)
EOSINOPHIL NFR BLD AUTO: 7 % (ref 0–6)
ERYTHROCYTE [DISTWIDTH] IN BLOOD BY AUTOMATED COUNT: 14.9 % (ref 11.7–14.2)
GLOBULIN SER CALC-MCNC: 5.1 G/DL (ref 2.2–4)
GLUCOSE SERPL-MCNC: 83 MG/DL (ref 70–99)
HCT VFR BLD AUTO: 33 % (ref 33–51)
HGB BLD-MCNC: 9.9 G/DL (ref 11.5–16)
IMM GRANULOCYTES # BLD AUTO: 0.04 K/MM3 (ref 0–0.1)
IMM GRANULOCYTES NFR BLD AUTO: 1 % (ref 0–1)
LYMPHOCYTES # BLD AUTO: 1.48 K/MM3 (ref 0.84–5.2)
LYMPHOCYTES NFR BLD AUTO: 29 % (ref 21–46)
MCHC RBC AUTO-ENTMCNC: 30 G/DL (ref 31.5–36.5)
MCV RBC AUTO: 87 FL (ref 80–100)
MONOCYTES # BLD AUTO: 0.67 K/MM3 (ref 0.16–1.47)
MONOCYTES NFR BLD AUTO: 13 % (ref 4–13)
NEUTROPHILS # BLD AUTO: 2.49 K/MM3 (ref 1.96–9.15)
NEUTROPHILS NFR BLD AUTO: 49 % (ref 41–73)
NRBC # BLD AUTO: 0 K/MM3 (ref 0–0.02)
NRBC BLD AUTO-RTO: 0 /100 WBC (ref 0–0.2)
PLATELET # BLD AUTO: 443 K/MM3 (ref 150–400)
POTASSIUM SERPL-SCNC: 3.9 MMOL/L (ref 3.5–5.5)
PROT SERPL-MCNC: 7.6 G/DL (ref 6.4–8.2)
SODIUM SERPL-SCNC: 140 MMOL/L (ref 136–145)

## 2020-10-20 ENCOUNTER — HOSPITAL ENCOUNTER (OUTPATIENT)
Dept: HOSPITAL 95 - LAB | Age: 65
End: 2020-10-20
Attending: INTERNAL MEDICINE
Payer: MEDICARE

## 2020-10-20 DIAGNOSIS — Z79.2: ICD-10-CM

## 2020-10-20 DIAGNOSIS — T84.63XA: Primary | ICD-10-CM

## 2020-10-20 DIAGNOSIS — S06.9X0S: ICD-10-CM

## 2020-10-20 DIAGNOSIS — B95.61: ICD-10-CM

## 2020-10-20 DIAGNOSIS — E78.5: ICD-10-CM

## 2020-10-20 DIAGNOSIS — R78.81: ICD-10-CM

## 2020-10-20 DIAGNOSIS — G06.1: ICD-10-CM

## 2020-10-20 LAB
ALBUMIN SERPL BCP-MCNC: 2.7 G/DL (ref 3.4–5)
ALBUMIN/GLOB SERPL: 0.6 {RATIO} (ref 0.8–1.8)
ALT SERPL W P-5'-P-CCNC: 15 U/L (ref 12–78)
ANION GAP SERPL CALCULATED.4IONS-SCNC: 6 MMOL/L (ref 6–16)
AST SERPL W P-5'-P-CCNC: 14 U/L (ref 12–37)
BASOPHILS # BLD AUTO: 0.07 K/MM3 (ref 0–0.23)
BASOPHILS NFR BLD AUTO: 1 % (ref 0–2)
BILIRUB SERPL-MCNC: 0.3 MG/DL (ref 0.1–1)
BUN SERPL-MCNC: 12 MG/DL (ref 8–24)
CALCIUM SERPL-MCNC: 9.1 MG/DL (ref 8.5–10.1)
CHLORIDE SERPL-SCNC: 109 MMOL/L (ref 98–108)
CO2 SERPL-SCNC: 25 MMOL/L (ref 21–32)
CREAT SERPL-MCNC: 0.62 MG/DL (ref 0.4–1)
CRP SERPL-MCNC: 2.16 MG/DL (ref 0–0.3)
DEPRECATED RDW RBC AUTO: 47.7 FL (ref 35.1–46.3)
EOSINOPHIL # BLD AUTO: 0.58 K/MM3 (ref 0–0.68)
EOSINOPHIL NFR BLD AUTO: 10 % (ref 0–6)
ERYTHROCYTE [DISTWIDTH] IN BLOOD BY AUTOMATED COUNT: 14.8 % (ref 11.7–14.2)
GLOBULIN SER CALC-MCNC: 4.3 G/DL (ref 2.2–4)
GLUCOSE SERPL-MCNC: 91 MG/DL (ref 70–99)
HCT VFR BLD AUTO: 34.6 % (ref 33–51)
HGB BLD-MCNC: 10.2 G/DL (ref 11.5–16)
IMM GRANULOCYTES # BLD AUTO: 0.01 K/MM3 (ref 0–0.1)
IMM GRANULOCYTES NFR BLD AUTO: 0 % (ref 0–1)
LYMPHOCYTES # BLD AUTO: 1.67 K/MM3 (ref 0.84–5.2)
LYMPHOCYTES NFR BLD AUTO: 30 % (ref 21–46)
MCHC RBC AUTO-ENTMCNC: 29.5 G/DL (ref 31.5–36.5)
MCV RBC AUTO: 88 FL (ref 80–100)
MONOCYTES # BLD AUTO: 0.6 K/MM3 (ref 0.16–1.47)
MONOCYTES NFR BLD AUTO: 11 % (ref 4–13)
NEUTROPHILS # BLD AUTO: 2.69 K/MM3 (ref 1.96–9.15)
NEUTROPHILS NFR BLD AUTO: 48 % (ref 41–73)
NRBC # BLD AUTO: 0 K/MM3 (ref 0–0.02)
NRBC BLD AUTO-RTO: 0 /100 WBC (ref 0–0.2)
PLATELET # BLD AUTO: 439 K/MM3 (ref 150–400)
POTASSIUM SERPL-SCNC: 3.8 MMOL/L (ref 3.5–5.5)
PROT SERPL-MCNC: 7 G/DL (ref 6.4–8.2)
SODIUM SERPL-SCNC: 140 MMOL/L (ref 136–145)

## 2020-10-27 ENCOUNTER — HOSPITAL ENCOUNTER (OUTPATIENT)
Dept: HOSPITAL 95 - LAB | Age: 65
Discharge: HOME | End: 2020-10-27
Attending: INTERNAL MEDICINE
Payer: MEDICARE

## 2020-10-27 DIAGNOSIS — R78.81: Primary | ICD-10-CM

## 2020-10-27 LAB
ALBUMIN SERPL BCP-MCNC: 2.8 G/DL (ref 3.4–5)
ALBUMIN/GLOB SERPL: 0.6 {RATIO} (ref 0.8–1.8)
ALT SERPL W P-5'-P-CCNC: 13 U/L (ref 12–78)
ANION GAP SERPL CALCULATED.4IONS-SCNC: 5 MMOL/L (ref 6–16)
AST SERPL W P-5'-P-CCNC: 14 U/L (ref 12–37)
BILIRUB SERPL-MCNC: 0.6 MG/DL (ref 0.1–1)
BUN SERPL-MCNC: 11 MG/DL (ref 8–24)
CALCIUM SERPL-MCNC: 9.3 MG/DL (ref 8.5–10.1)
CHLORIDE SERPL-SCNC: 107 MMOL/L (ref 98–108)
CO2 SERPL-SCNC: 29 MMOL/L (ref 21–32)
CREAT SERPL-MCNC: 0.54 MG/DL (ref 0.4–1)
CRP SERPL-MCNC: 2.1 MG/DL (ref 0–0.3)
DEPRECATED RDW RBC AUTO: 47.6 FL (ref 35.1–46.3)
ERYTHROCYTE [DISTWIDTH] IN BLOOD BY AUTOMATED COUNT: 14.6 % (ref 11.7–14.2)
GLOBULIN SER CALC-MCNC: 4.5 G/DL (ref 2.2–4)
GLUCOSE SERPL-MCNC: 84 MG/DL (ref 70–99)
HCT VFR BLD AUTO: 35.6 % (ref 33–51)
HGB BLD-MCNC: 10.4 G/DL (ref 11.5–16)
MCHC RBC AUTO-ENTMCNC: 29.2 G/DL (ref 31.5–36.5)
MCV RBC AUTO: 89 FL (ref 80–100)
NRBC # BLD AUTO: 0 K/MM3 (ref 0–0.02)
NRBC BLD AUTO-RTO: 0 /100 WBC (ref 0–0.2)
PLATELET # BLD AUTO: 396 K/MM3 (ref 150–400)
POTASSIUM SERPL-SCNC: 3.8 MMOL/L (ref 3.5–5.5)
PROT SERPL-MCNC: 7.3 G/DL (ref 6.4–8.2)
SODIUM SERPL-SCNC: 141 MMOL/L (ref 136–145)

## 2020-10-28 ENCOUNTER — HOSPITAL ENCOUNTER (EMERGENCY)
Dept: HOSPITAL 95 - ER | Age: 65
Discharge: HOME | End: 2020-10-28
Payer: MEDICARE

## 2020-10-28 VITALS — BODY MASS INDEX: 22.44 KG/M2 | WEIGHT: 142.99 LBS | HEIGHT: 67 IN

## 2020-10-28 DIAGNOSIS — Z87.891: ICD-10-CM

## 2020-10-28 DIAGNOSIS — R07.9: ICD-10-CM

## 2020-10-28 DIAGNOSIS — Z88.1: ICD-10-CM

## 2020-10-28 DIAGNOSIS — R91.1: ICD-10-CM

## 2020-10-28 DIAGNOSIS — Z79.899: ICD-10-CM

## 2020-10-28 DIAGNOSIS — Z88.2: ICD-10-CM

## 2020-10-28 DIAGNOSIS — R06.02: Primary | ICD-10-CM

## 2020-10-28 DIAGNOSIS — F32.9: ICD-10-CM

## 2020-10-28 DIAGNOSIS — K21.9: ICD-10-CM

## 2020-10-28 LAB
ALBUMIN SERPL BCP-MCNC: 3.1 G/DL (ref 3.4–5)
ALBUMIN/GLOB SERPL: 0.7 {RATIO} (ref 0.8–1.8)
ALT SERPL W P-5'-P-CCNC: 6 U/L (ref 12–78)
ANION GAP SERPL CALCULATED.4IONS-SCNC: 7 MMOL/L (ref 6–16)
AST SERPL W P-5'-P-CCNC: 13 U/L (ref 12–37)
BASOPHILS # BLD AUTO: 0.09 K/MM3 (ref 0–0.23)
BASOPHILS NFR BLD AUTO: 2 % (ref 0–2)
BILIRUB SERPL-MCNC: 0.3 MG/DL (ref 0.1–1)
BUN SERPL-MCNC: 14 MG/DL (ref 8–24)
CALCIUM SERPL-MCNC: 10 MG/DL (ref 8.5–10.1)
CHLORIDE SERPL-SCNC: 105 MMOL/L (ref 98–108)
CO2 SERPL-SCNC: 29 MMOL/L (ref 21–32)
CREAT SERPL-MCNC: 0.62 MG/DL (ref 0.4–1)
DEPRECATED RDW RBC AUTO: 46.8 FL (ref 35.1–46.3)
EOSINOPHIL # BLD AUTO: 0.56 K/MM3 (ref 0–0.68)
EOSINOPHIL NFR BLD AUTO: 9 % (ref 0–6)
ERYTHROCYTE [DISTWIDTH] IN BLOOD BY AUTOMATED COUNT: 14.5 % (ref 11.7–14.2)
GLOBULIN SER CALC-MCNC: 4.6 G/DL (ref 2.2–4)
GLUCOSE SERPL-MCNC: 98 MG/DL (ref 70–99)
HCT VFR BLD AUTO: 38.2 % (ref 33–51)
HGB BLD-MCNC: 11.2 G/DL (ref 11.5–16)
IMM GRANULOCYTES # BLD AUTO: 0.02 K/MM3 (ref 0–0.1)
IMM GRANULOCYTES NFR BLD AUTO: 0 % (ref 0–1)
LYMPHOCYTES # BLD AUTO: 1.74 K/MM3 (ref 0.84–5.2)
LYMPHOCYTES NFR BLD AUTO: 29 % (ref 21–46)
MCHC RBC AUTO-ENTMCNC: 29.3 G/DL (ref 31.5–36.5)
MCV RBC AUTO: 88 FL (ref 80–100)
MONOCYTES # BLD AUTO: 0.68 K/MM3 (ref 0.16–1.47)
MONOCYTES NFR BLD AUTO: 11 % (ref 4–13)
NEUTROPHILS # BLD AUTO: 3.02 K/MM3 (ref 1.96–9.15)
NEUTROPHILS NFR BLD AUTO: 49 % (ref 41–73)
NRBC # BLD AUTO: 0 K/MM3 (ref 0–0.02)
NRBC BLD AUTO-RTO: 0 /100 WBC (ref 0–0.2)
PLATELET # BLD AUTO: 382 K/MM3 (ref 150–400)
POTASSIUM SERPL-SCNC: 4.3 MMOL/L (ref 3.5–5.5)
PROT SERPL-MCNC: 7.7 G/DL (ref 6.4–8.2)
SODIUM SERPL-SCNC: 141 MMOL/L (ref 136–145)
TROPONIN I SERPL-MCNC: <0.015 NG/ML (ref 0–0.04)

## 2020-11-03 ENCOUNTER — HOSPITAL ENCOUNTER (OUTPATIENT)
Dept: HOSPITAL 95 - LAB SHORT | Age: 65
Discharge: HOME | End: 2020-11-03
Attending: FAMILY MEDICINE
Payer: MEDICARE

## 2020-11-03 DIAGNOSIS — Z79.2: ICD-10-CM

## 2020-11-03 DIAGNOSIS — E78.5: ICD-10-CM

## 2020-11-03 DIAGNOSIS — T84.63XA: Primary | ICD-10-CM

## 2020-11-03 DIAGNOSIS — R78.81: ICD-10-CM

## 2020-11-03 LAB
ALBUMIN SERPL BCP-MCNC: 3 G/DL (ref 3.4–5)
ALBUMIN/GLOB SERPL: 0.6 {RATIO} (ref 0.8–1.8)
ALT SERPL W P-5'-P-CCNC: 16 U/L (ref 12–78)
ANION GAP SERPL CALCULATED.4IONS-SCNC: 7 MMOL/L (ref 6–16)
AST SERPL W P-5'-P-CCNC: 18 U/L (ref 12–37)
BILIRUB SERPL-MCNC: 0.3 MG/DL (ref 0.1–1)
BUN SERPL-MCNC: 14 MG/DL (ref 8–24)
CALCIUM SERPL-MCNC: 9.4 MG/DL (ref 8.5–10.1)
CHLORIDE SERPL-SCNC: 102 MMOL/L (ref 98–108)
CO2 SERPL-SCNC: 28 MMOL/L (ref 21–32)
CREAT SERPL-MCNC: 0.5 MG/DL (ref 0.4–1)
CRP SERPL-MCNC: 3.9 MG/DL (ref 0–0.3)
DEPRECATED RDW RBC AUTO: 45.7 FL (ref 35.1–46.3)
ERYTHROCYTE [DISTWIDTH] IN BLOOD BY AUTOMATED COUNT: 14.2 % (ref 11.7–14.2)
GLOBULIN SER CALC-MCNC: 4.8 G/DL (ref 2.2–4)
GLUCOSE SERPL-MCNC: 102 MG/DL (ref 70–99)
HCT VFR BLD AUTO: 39.6 % (ref 33–51)
HGB BLD-MCNC: 11.6 G/DL (ref 11.5–16)
MCHC RBC AUTO-ENTMCNC: 29.3 G/DL (ref 31.5–36.5)
MCV RBC AUTO: 87 FL (ref 80–100)
NRBC # BLD AUTO: 0 K/MM3 (ref 0–0.02)
NRBC BLD AUTO-RTO: 0 /100 WBC (ref 0–0.2)
PLATELET # BLD AUTO: 346 K/MM3 (ref 150–400)
POTASSIUM SERPL-SCNC: 3.8 MMOL/L (ref 3.5–5.5)
PROT SERPL-MCNC: 7.8 G/DL (ref 6.4–8.2)
SODIUM SERPL-SCNC: 137 MMOL/L (ref 136–145)

## 2023-08-03 ENCOUNTER — HOSPITAL ENCOUNTER (OUTPATIENT)
Dept: HOSPITAL 95 - LAB SHORT | Age: 68
Discharge: HOME | End: 2023-08-03
Attending: NURSE PRACTITIONER
Payer: MEDICARE

## 2023-08-03 DIAGNOSIS — R30.0: Primary | ICD-10-CM

## 2025-01-15 ENCOUNTER — HOSPITAL ENCOUNTER (EMERGENCY)
Dept: HOSPITAL 95 - ER | Age: 70
Discharge: HOME | End: 2025-01-15
Payer: COMMERCIAL

## 2025-01-15 VITALS — BODY MASS INDEX: 28.25 KG/M2 | WEIGHT: 180.01 LBS | HEIGHT: 67 IN

## 2025-01-15 VITALS — DIASTOLIC BLOOD PRESSURE: 57 MMHG | SYSTOLIC BLOOD PRESSURE: 99 MMHG

## 2025-01-15 DIAGNOSIS — S39.012A: Primary | ICD-10-CM

## 2025-01-15 DIAGNOSIS — Z87.891: ICD-10-CM

## 2025-01-15 DIAGNOSIS — K21.9: ICD-10-CM

## 2025-01-15 DIAGNOSIS — Z88.1: ICD-10-CM

## 2025-01-15 DIAGNOSIS — S70.02XA: ICD-10-CM

## 2025-01-15 DIAGNOSIS — T39.1X1A: ICD-10-CM
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
## 2025-01-15 DIAGNOSIS — Z79.899: ICD-10-CM

## 2025-01-15 LAB
ALBUMIN SERPL BCP-MCNC: 3.4 G/DL (ref 3.4–5)
ALBUMIN/GLOB SERPL: 0.9 {RATIO} (ref 0.8–1.8)
ALT SERPL W P-5'-P-CCNC: 26 U/L (ref 12–78)
ANION GAP SERPL CALCULATED.4IONS-SCNC: 11 MMOL/L (ref 3–11)
APAP SERPL-MCNC: 14.9 UG/ML (ref 10–30)
AST SERPL W P-5'-P-CCNC: 31 U/L (ref 12–37)
BASOPHILS # BLD AUTO: 0.04 K/MM3 (ref 0–0.23)
BASOPHILS NFR BLD AUTO: 1 % (ref 0–2)
BILIRUB DIRECT SERPL-MCNC: <0.1 MG/DL (ref 0–0.3)
BILIRUB SERPL-MCNC: 0.3 MG/DL (ref 0.1–1)
BUN SERPL-MCNC: 15 MG/DL (ref 8–24)
CALCIUM SERPL-MCNC: 9.2 MG/DL (ref 8.5–10.1)
CHLORIDE SERPL-SCNC: 113 MMOL/L (ref 98–108)
CO2 SERPL-SCNC: 23 MMOL/L (ref 21–32)
CREAT SERPL-MCNC: 0.84 MG/DL (ref 0.4–1)
DEPRECATED RDW RBC AUTO: 47 FL (ref 35.1–46.3)
EOSINOPHIL # BLD AUTO: 0.19 K/MM3 (ref 0–0.68)
EOSINOPHIL NFR BLD AUTO: 3 % (ref 0–6)
ERYTHROCYTE [DISTWIDTH] IN BLOOD BY AUTOMATED COUNT: 14.8 % (ref 11.7–14.2)
GLOBULIN SER CALC-MCNC: 3.9 G/DL (ref 2.2–4)
GLUCOSE SERPL-MCNC: 100 MG/DL (ref 70–99)
HCT VFR BLD AUTO: 36.2 % (ref 33–51)
HGB BLD-MCNC: 11.3 G/DL (ref 11.5–16)
IMM GRANULOCYTES # BLD AUTO: 0.02 K/MM3 (ref 0–0.1)
IMM GRANULOCYTES NFR BLD AUTO: 0 % (ref 0–1)
LYMPHOCYTES # BLD AUTO: 1.85 K/MM3 (ref 0.84–5.2)
LYMPHOCYTES NFR BLD AUTO: 33 % (ref 21–46)
MCHC RBC AUTO-ENTMCNC: 31.2 G/DL (ref 31.5–36.5)
MCV RBC AUTO: 86 FL (ref 80–100)
MONOCYTES # BLD AUTO: 0.51 K/MM3 (ref 0.16–1.47)
MONOCYTES NFR BLD AUTO: 9 % (ref 4–13)
NEUTROPHILS # BLD AUTO: 3.09 K/MM3 (ref 1.96–9.15)
NEUTROPHILS NFR BLD AUTO: 54 % (ref 41–73)
NRBC # BLD AUTO: 0 K/MM3 (ref 0–0.02)
NRBC BLD AUTO-RTO: 0 /100 WBC (ref 0–0.2)
PLATELET # BLD AUTO: 385 K/MM3 (ref 150–400)
POTASSIUM SERPL-SCNC: 3.8 MMOL/L (ref 3.5–5.5)
PROT SERPL-MCNC: 7.3 G/DL (ref 6.4–8.2)
SALICYLATES SERPL-MCNC: <1.7 MG/DL (ref 2.8–20)
SODIUM SERPL-SCNC: 143 MMOL/L (ref 136–145)

## 2025-01-15 PROCEDURE — G0480 DRUG TEST DEF 1-7 CLASSES: HCPCS

## 2025-01-15 PROCEDURE — A9270 NON-COVERED ITEM OR SERVICE: HCPCS

## 2025-01-30 ENCOUNTER — HOSPITAL ENCOUNTER (OUTPATIENT)
Dept: HOSPITAL 95 - LAB | Age: 70
Discharge: HOME | End: 2025-01-30
Attending: FAMILY MEDICINE
Payer: COMMERCIAL

## 2025-01-30 DIAGNOSIS — N39.0: Primary | ICD-10-CM
